# Patient Record
Sex: MALE | Employment: FULL TIME | URBAN - METROPOLITAN AREA
[De-identification: names, ages, dates, MRNs, and addresses within clinical notes are randomized per-mention and may not be internally consistent; named-entity substitution may affect disease eponyms.]

---

## 2018-09-06 ENCOUNTER — TRANSCRIBE ORDERS (OUTPATIENT)
Dept: ADMINISTRATIVE | Facility: HOSPITAL | Age: 23
End: 2018-09-06

## 2018-09-06 DIAGNOSIS — B17.10 ACUTE HEPATITIS C VIRUS INFECTION WITHOUT HEPATIC COMA: Primary | ICD-10-CM

## 2018-09-10 ENCOUNTER — HOSPITAL ENCOUNTER (OUTPATIENT)
Dept: ULTRASOUND IMAGING | Facility: HOSPITAL | Age: 23
Discharge: HOME | End: 2018-09-10
Attending: INTERNAL MEDICINE
Payer: COMMERCIAL

## 2018-09-10 DIAGNOSIS — B17.10 ACUTE HEPATITIS C VIRUS INFECTION WITHOUT HEPATIC COMA: ICD-10-CM

## 2018-09-10 PROCEDURE — 76700 US EXAM ABDOM COMPLETE: CPT

## 2019-11-18 ENCOUNTER — HOSPITAL ENCOUNTER (EMERGENCY)
Facility: HOSPITAL | Age: 24
Discharge: HOME | End: 2019-11-18
Attending: EMERGENCY MEDICINE
Payer: COMMERCIAL

## 2019-11-18 VITALS
RESPIRATION RATE: 18 BRPM | OXYGEN SATURATION: 96 % | HEART RATE: 69 BPM | DIASTOLIC BLOOD PRESSURE: 84 MMHG | WEIGHT: 180 LBS | SYSTOLIC BLOOD PRESSURE: 130 MMHG

## 2019-11-18 DIAGNOSIS — R79.89 ELEVATED LFTS: ICD-10-CM

## 2019-11-18 DIAGNOSIS — R11.2 NAUSEA AND VOMITING: Primary | ICD-10-CM

## 2019-11-18 DIAGNOSIS — R10.9 ABDOMINAL PAIN: ICD-10-CM

## 2019-11-18 DIAGNOSIS — F10.10 ALCOHOL ABUSE: ICD-10-CM

## 2019-11-18 LAB
ALBUMIN SERPL BCP-MCNC: 4.3 G/DL (ref 3.5–5)
ALP SERPL-CCNC: 98 U/L (ref 46–116)
ALT SERPL W P-5'-P-CCNC: 328 U/L (ref 12–78)
ANION GAP SERPL CALCULATED.3IONS-SCNC: 6 MMOL/L (ref 4–13)
AST SERPL W P-5'-P-CCNC: 415 U/L (ref 5–45)
BASOPHILS # BLD AUTO: 0.1 THOUSANDS/ΜL (ref 0–0.1)
BASOPHILS NFR BLD AUTO: 1 % (ref 0–1)
BILIRUB SERPL-MCNC: 1.04 MG/DL (ref 0.2–1)
BUN SERPL-MCNC: 6 MG/DL (ref 5–25)
CALCIUM SERPL-MCNC: 9.3 MG/DL (ref 8.3–10.1)
CHLORIDE SERPL-SCNC: 102 MMOL/L (ref 100–108)
CO2 SERPL-SCNC: 29 MMOL/L (ref 21–32)
CREAT SERPL-MCNC: 1.2 MG/DL (ref 0.6–1.3)
EOSINOPHIL # BLD AUTO: 0.04 THOUSAND/ΜL (ref 0–0.61)
EOSINOPHIL NFR BLD AUTO: 1 % (ref 0–6)
ERYTHROCYTE [DISTWIDTH] IN BLOOD BY AUTOMATED COUNT: 12.7 % (ref 11.6–15.1)
GFR SERPL CREATININE-BSD FRML MDRD: 84 ML/MIN/1.73SQ M
GLUCOSE SERPL-MCNC: 100 MG/DL (ref 65–140)
HCT VFR BLD AUTO: 49.2 % (ref 36.5–49.3)
HGB BLD-MCNC: 16.8 G/DL (ref 12–17)
IMM GRANULOCYTES # BLD AUTO: 0.02 THOUSAND/UL (ref 0–0.2)
IMM GRANULOCYTES NFR BLD AUTO: 0 % (ref 0–2)
INR PPP: 0.94 (ref 0.84–1.19)
LIPASE SERPL-CCNC: 87 U/L (ref 73–393)
LYMPHOCYTES # BLD AUTO: 1.23 THOUSANDS/ΜL (ref 0.6–4.47)
LYMPHOCYTES NFR BLD AUTO: 16 % (ref 14–44)
MCH RBC QN AUTO: 32.4 PG (ref 26.8–34.3)
MCHC RBC AUTO-ENTMCNC: 34.1 G/DL (ref 31.4–37.4)
MCV RBC AUTO: 95 FL (ref 82–98)
MONOCYTES # BLD AUTO: 0.88 THOUSAND/ΜL (ref 0.17–1.22)
MONOCYTES NFR BLD AUTO: 12 % (ref 4–12)
NEUTROPHILS # BLD AUTO: 5.21 THOUSANDS/ΜL (ref 1.85–7.62)
NEUTS SEG NFR BLD AUTO: 70 % (ref 43–75)
NRBC BLD AUTO-RTO: 0 /100 WBCS
PLATELET # BLD AUTO: 305 THOUSANDS/UL (ref 149–390)
PMV BLD AUTO: 9.9 FL (ref 8.9–12.7)
POTASSIUM SERPL-SCNC: 4.4 MMOL/L (ref 3.5–5.3)
PROT SERPL-MCNC: 8.2 G/DL (ref 6.4–8.2)
PROTHROMBIN TIME: 12.2 SECONDS (ref 11.6–14.5)
RBC # BLD AUTO: 5.19 MILLION/UL (ref 3.88–5.62)
SODIUM SERPL-SCNC: 137 MMOL/L (ref 136–145)
WBC # BLD AUTO: 7.48 THOUSAND/UL (ref 4.31–10.16)

## 2019-11-18 PROCEDURE — 85610 PROTHROMBIN TIME: CPT | Performed by: EMERGENCY MEDICINE

## 2019-11-18 PROCEDURE — C9113 INJ PANTOPRAZOLE SODIUM, VIA: HCPCS | Performed by: EMERGENCY MEDICINE

## 2019-11-18 PROCEDURE — 99285 EMERGENCY DEPT VISIT HI MDM: CPT | Performed by: EMERGENCY MEDICINE

## 2019-11-18 PROCEDURE — 80053 COMPREHEN METABOLIC PANEL: CPT | Performed by: EMERGENCY MEDICINE

## 2019-11-18 PROCEDURE — 99284 EMERGENCY DEPT VISIT MOD MDM: CPT

## 2019-11-18 PROCEDURE — 83690 ASSAY OF LIPASE: CPT | Performed by: EMERGENCY MEDICINE

## 2019-11-18 PROCEDURE — 96374 THER/PROPH/DIAG INJ IV PUSH: CPT

## 2019-11-18 PROCEDURE — 36415 COLL VENOUS BLD VENIPUNCTURE: CPT | Performed by: EMERGENCY MEDICINE

## 2019-11-18 PROCEDURE — 85025 COMPLETE CBC W/AUTO DIFF WBC: CPT | Performed by: EMERGENCY MEDICINE

## 2019-11-18 RX ORDER — MAGNESIUM HYDROXIDE/ALUMINUM HYDROXICE/SIMETHICONE 120; 1200; 1200 MG/30ML; MG/30ML; MG/30ML
30 SUSPENSION ORAL ONCE
Status: COMPLETED | OUTPATIENT
Start: 2019-11-18 | End: 2019-11-18

## 2019-11-18 RX ORDER — OMEPRAZOLE 20 MG/1
20 TABLET, DELAYED RELEASE ORAL DAILY
Qty: 30 TABLET | Refills: 1 | Status: SHIPPED | OUTPATIENT
Start: 2019-11-18 | End: 2020-07-13

## 2019-11-18 RX ORDER — PANTOPRAZOLE SODIUM 40 MG/1
40 INJECTION, POWDER, FOR SOLUTION INTRAVENOUS ONCE
Status: COMPLETED | OUTPATIENT
Start: 2019-11-18 | End: 2019-11-18

## 2019-11-18 RX ORDER — SUCRALFATE ORAL 1 G/10ML
1000 SUSPENSION ORAL ONCE
Status: COMPLETED | OUTPATIENT
Start: 2019-11-18 | End: 2019-11-18

## 2019-11-18 RX ADMIN — ALUMINUM HYDROXIDE, MAGNESIUM HYDROXIDE, AND SIMETHICONE 30 ML: 200; 200; 20 SUSPENSION ORAL at 08:24

## 2019-11-18 RX ADMIN — PANTOPRAZOLE SODIUM 40 MG: 40 INJECTION, POWDER, FOR SOLUTION INTRAVENOUS at 08:24

## 2019-11-18 RX ADMIN — SUCRALFATE 1000 MG: 1 SUSPENSION ORAL at 08:24

## 2019-11-18 NOTE — DISCHARGE INSTRUCTIONS
Call your GI specialist for follow-up today  Also follow up with the alcohol rehabilitation resources I have provided you  Take Prilosec daily as prescribed  Return to the ER with recurrent bloody vomit, worsening or changing abdominal pain, black or bloody stools, or any other concerning symptoms

## 2019-11-18 NOTE — ED ATTENDING ATTESTATION
11/18/2019  IZheng DO, saw and evaluated the patient  I have discussed the patient with the resident/non-physician practitioner and agree with the resident's/non-physician practitioner's findings, Plan of Care, and MDM as documented in the resident's/non-physician practitioner's note, except where noted  All available labs and Radiology studies were reviewed  I was present for key portions of any procedure(s) performed by the resident/non-physician practitioner and I was immediately available to provide assistance  At this point I agree with the current assessment done in the Emergency Department  I have conducted an independent evaluation of this patient a history and physical is as follows:      21 yom with hx of seizures, hep c, heavy ETOH abuse  1 bottle of rum  Vomiting in the morning for the past few days  NBNB until 3 days ago  Vomited dark"blood"  However past 24 hours no vomiting blood  Last drink 4 hours ago  [place]  Exam is soft, tender in epigastrum  No peritoniotis tachycardic  Nad  No tremors  A/P: hematemesis  resolved  Consider zora cervantes/gastritis  Check labs for liver failure, doubt varices, if hgb normal and lytes normal, tx for gastritis   Outpatient info for etoh cessation tx      ED Course         Critical Care Time  Procedures

## 2019-11-18 NOTE — ED PROVIDER NOTES
History  Chief Complaint   Patient presents with    Abdominal Pain     Pt presents with womiting and generalized abd pain since Friday  Blood noted in vomit  18-year-old male with past medical history of seizures not on antiepileptics, hepatitis C, and alcohol abuse who is presenting with vomiting  Patient reports that for the past 2 weeks, he has had vomiting nearly every day  He states that the vomiting occurs at night  Patient will wake from sleep and vomit  Vomit is usually stomach contents  Patient reports that on the night of November 16th, he did have dark blood in his vomit as well as coffee-ground material   However, the past several nights, the patient has not had any blood in his vomit  Patient reports associated abdominal pain which is sometimes in the left upper quadrant and sometimes in the right paraumbilical region  This pain is usually present when he wakes up but goes away without any intervention  He is not aware of any precipitating factors  Patient states that he did have a headache on Friday night but this resolved with Tylenol  Otherwise, patient denies any headache at this time, vision changes, focal numbness or weakness, neck pain or neck stiffness, chest pain or pressure, shortness of breath, nausea, active abdominal pain, diarrhea, constipation, melena, hematochezia, and urinary symptoms  Patient reports active alcohol abuse  He states he drinks 1 bottle of rum daily  Patient states that he gets shaky when he does not drink  He denies any clear history of alcohol withdrawal seizures  He did have seizure-like activity in April 2019  He was seen at [facility] for this  Ultimately, he left against medical advice  Patient states that at that time, he was drinking several beers per day but not drinking as heavily as he has right now  Patient denies any NSAID use    He is aware of his hepatitis C diagnosis but states that he was told by his GI specialist that it is not bad enough to be treated  Patient is not on any daily medications  No other concerns on history  None       Past Medical History:   Diagnosis Date    Hepatitis C     Seizures ([facility] [address] )        History reviewed  No pertinent surgical history  No family history on file  I have reviewed and agree with the history as documented  Social History     Tobacco Use    Smoking status: Current Every Day Smoker     Types: Cigarettes   Substance Use Topics    Alcohol use: Yes     Comment: 100 proof rum  A bottle a day   Drug use: Not Currently        Review of Systems   Constitutional: Negative for diaphoresis, fever and unexpected weight change  HENT: Negative for congestion, rhinorrhea and sore throat  Eyes: Negative for pain, discharge and visual disturbance  Respiratory: Negative for cough, shortness of breath and wheezing  Cardiovascular: Negative for chest pain, palpitations and leg swelling  Gastrointestinal: Positive for abdominal pain (intermittent, not at this time), nausea (not at this time) and vomiting (at night)  Negative for blood in stool, constipation and diarrhea  Genitourinary: Negative for dysuria, flank pain and hematuria  Musculoskeletal: Negative for arthralgias and myalgias  Skin: Negative for rash and wound  Allergic/Immunologic: Negative for environmental allergies and food allergies  Neurological: Negative for dizziness, seizures, weakness and numbness  Hematological: Negative for adenopathy  Psychiatric/Behavioral: Negative for confusion and hallucinations         Physical Exam  ED Triage Vitals [11/18/19 0739]   Temp Pulse Respirations Blood Pressure SpO2   -- (!) 118 16 (!) 172/102 97 %      Temp src Heart Rate Source Patient Position - Orthostatic VS BP Location FiO2 (%)   -- -- Lying Right arm --      Pain Score       6             Orthostatic Vital Signs  Vitals:    11/18/19 0739 11/18/19 0927   BP: (!) 172/102 130/84   Pulse: (!) 118 69   Patient Position - Orthostatic VS: Lying        Physical Exam   Constitutional: He is oriented to person, place, and time  He appears well-developed and well-nourished  HENT:   Head: Normocephalic and atraumatic  Right Ear: External ear normal    Left Ear: External ear normal    Nose: Nose normal    Eyes: Pupils are equal, round, and reactive to light  EOM are normal    Neck: Normal range of motion  Neck supple  Cardiovascular: Regular rhythm and normal heart sounds  Tachycardia present  No murmur heard  Pulmonary/Chest: Effort normal and breath sounds normal  No respiratory distress  He has no wheezes  He has no rales  Abdominal: Soft  Bowel sounds are normal  He exhibits no distension  There is no tenderness  There is no guarding  Musculoskeletal: Normal range of motion  He exhibits no deformity  Neurological: He is alert and oriented to person, place, and time  No gross motor deficits noted  Cranial nerves II-XII are intact  Speech is fluent without dysarthria or aphasia  Skin: Skin is warm and dry  Capillary refill takes less than 2 seconds  He is not diaphoretic  Psychiatric: He has a normal mood and affect  His behavior is normal    Nursing note and vitals reviewed        ED Medications  Medications   sucralfate (CARAFATE) oral suspension 1,000 mg (1,000 mg Oral Given 11/18/19 0824)   pantoprazole (PROTONIX) injection 40 mg (40 mg Intravenous Given 11/18/19 0824)   aluminum-magnesium hydroxide-simethicone (MYLANTA) 200-200-20 mg/5 mL oral suspension 30 mL (30 mL Oral Given 11/18/19 0824)       Diagnostic Studies  Results Reviewed     Procedure Component Value Units Date/Time    Comprehensive metabolic panel [[id number]]  (Abnormal) Collected:  11/18/19 0821    Lab Status:  Final result Specimen:  Blood from Arm, Left Updated:  11/18/19 0851     Sodium 137 mmol/L      Potassium 4 4 mmol/L      Chloride 102 mmol/L      CO2 29 mmol/L      ANION GAP 6 mmol/L      BUN 6 mg/dL      Creatinine 1 20 mg/dL Glucose 100 mg/dL      Calcium 9 3 mg/dL       U/L       U/L      Alkaline Phosphatase 98 U/L      Total Protein 8 2 g/dL      Albumin 4 3 g/dL      Total Bilirubin 1 04 mg/dL      eGFR 84 ml/min/1 73sq m     Narrative:       [place] guidelines for Chronic Kidney Disease (CKD):     Stage 1 with normal or high GFR (GFR > 90 mL/min/1 73 square meters)    Stage 2 Mild CKD (GFR = 60-89 mL/min/1 73 square meters)    Stage 3A Moderate CKD (GFR = 45-59 mL/min/1 73 square meters)    Stage 3B Moderate CKD (GFR = 30-44 mL/min/1 73 square meters)    Stage 4 Severe CKD (GFR = 15-29 mL/min/1 73 square meters)    Stage 5 End Stage CKD (GFR <15 mL/min/1 73 square meters)  Note: GFR calculation is accurate only with a steady state creatinine    Lipase [[id number]]  (Normal) Collected:  11/18/19 0821    Lab Status:  Final result Specimen:  Blood from Arm, Left Updated:  11/18/19 0851     Lipase 87 u/L     Protime-INR [[id number]]  (Normal) Collected:  11/18/19 0821    Lab Status:  Final result Specimen:  Blood from Arm, Left Updated:  11/18/19 0844     Protime 12 2 seconds      INR 0 94    CBC and differential [[id number]] Collected:  11/18/19 0821    Lab Status:  Final result Specimen:  Blood from Arm, Left Updated:  11/18/19 0836     WBC 7 48 Thousand/uL      RBC 5 19 Million/uL      Hemoglobin 16 8 g/dL      Hematocrit 49 2 %      MCV 95 fL      MCH 32 4 pg      MCHC 34 1 g/dL      RDW 12 7 %      MPV 9 9 fL      Platelets 727 Thousands/uL      nRBC 0 /100 WBCs      Neutrophils Relative 70 %      Immat GRANS % 0 %      Lymphocytes Relative 16 %      Monocytes Relative 12 %      Eosinophils Relative 1 %      Basophils Relative 1 %      Neutrophils Absolute 5 21 Thousands/µL      Immature Grans Absolute 0 02 Thousand/uL      Lymphocytes Absolute 1 23 Thousands/µL      Monocytes Absolute 0 88 Thousand/µL      Eosinophils Absolute 0 04 Thousand/µL      Basophils Absolute 0 10 Thousands/µL                  No orders to display         Procedures  Procedures        ED Course  ED Course as of Nov 18 1721   Mon Nov 18, 2019   0849 Hemoglobin: 16 8   0849 INR: 0 94   0915 Lipase: 87   0915 AST(!): 415   0915 ALT(!): 328   0915 Creatinine: 1 20   0916 Per review of records from [facility], patient has elevated AST and ALT at baseline  ALT most recently 265 and AST most recently 130 in April 2019  Creatinine is near recent baseline from April 2019 and July 2018  MDM  Number of Diagnoses or Management Options  Abdominal pain: new and requires workup  Alcohol abuse: established and worsening  Elevated LFTs: established and worsening  Nausea and vomiting: new and requires workup  Diagnosis management comments:     Patient presented with intermittent abdominal pain, intermittent nausea and vomiting, and worsening alcohol abuse as above  He did have several episodes of vomiting blood several nights prior to arrival but not recently  He was asymptomatic on arrival   Patient noted to have history of alcohol abuse as well as hepatitis C which was untreated  Vital signs were within normal limits  Physical examination was unremarkable  Differential diagnosis included but was not limited to esophagitis, Madiha-Bob tear, gastritis, esophageal varices, and peptic ulcer disease  Patient was treated with PPI and GI cocktail  Labs were ordered as above and were unremarkable apart from worsening transaminitis, likely secondary to both hepatitis C and worsening alcohol abuse  Etiology of symptoms unclear; will treat patient for potential GERD or gastritis with PPI  Provided patient with drug and alcohol resources  Advised prompt GI follow-up  Discussed return precautions  Patient verbalized understanding         Amount and/or Complexity of Data Reviewed  Clinical lab tests: ordered and reviewed  Decide to obtain previous medical records or to obtain history from someone other than the patient: yes  Obtain history from someone other than the patient: yes  Review and summarize past medical records: yes    Risk of Complications, Morbidity, and/or Mortality  Presenting problems: moderate  Diagnostic procedures: minimal  Management options: minimal    Patient Progress  Patient progress: improved      Disposition  Final diagnoses:   Nausea and vomiting   Abdominal pain   Elevated LFTs   Alcohol abuse     Time reflects when diagnosis was documented in both MDM as applicable and the Disposition within this note     Time User Action Codes Description Comment    11/18/2019  9:22 AM Ynes Bachelor Add [R11 2] Nausea and vomiting     11/18/2019  9:22 AM Ynes Bachelor Add [R10 9] Abdominal pain     11/18/2019  9:22 AM Ynes Bachelor Add [R94 5] Elevated LFTs     11/18/2019  5:20 PM Ynes Bachelor Add [F10 929] Alcohol intoxication ([facility] [address] )     11/18/2019  5:21 PM Ynes Bachelor Remove [F10 929] Alcohol intoxication ([facility] [address] )     11/18/2019  5:21 PM Ynes Bachelor Add [F10 10] Alcohol abuse       ED Disposition     ED Disposition Condition Date/Time Comment    Discharge Good Mon Nov 18, 2019  9:22 AM [address] discharge to home/self care  Follow-up Information     Follow up With Specialties Details Why Contact Info Additional Information    GI Physician  Call today Please call your GI specialist for follow-up  Please call today  [address] [place] [address] Emergency Department Emergency Medicine Go to  If symptoms worsen  [address] [place] [address], [address] [place] [address], [address]   [phone]          Discharge Medication List as of 11/18/2019  9:25 AM      START taking these medications    Details   omeprazole (PriLOSEC OTC) 20 MG tablet Take 1 tablet (20 mg total) by mouth daily, Starting Mon 11/18/2019, Until Wed 12/18/2019, Print           No discharge procedures on file      ED Provider  Attending physically available and evaluated [address]  I managed the patient along with the ED Attending      Electronically Signed by         Eladio Del Rio MD  11/18/19 3001

## 2020-07-13 ENCOUNTER — HOSPITAL ENCOUNTER (EMERGENCY)
Facility: HOSPITAL | Age: 25
Discharge: HOME | End: 2020-07-13
Attending: EMERGENCY MEDICINE
Payer: OTHER GOVERNMENT

## 2020-07-13 ENCOUNTER — APPOINTMENT (EMERGENCY)
Dept: CT IMAGING | Facility: HOSPITAL | Age: 25
End: 2020-07-13
Payer: OTHER GOVERNMENT

## 2020-07-13 VITALS
TEMPERATURE: 97.3 F | HEART RATE: 75 BPM | DIASTOLIC BLOOD PRESSURE: 86 MMHG | WEIGHT: 156.97 LBS | OXYGEN SATURATION: 99 % | SYSTOLIC BLOOD PRESSURE: 135 MMHG | RESPIRATION RATE: 17 BRPM

## 2020-07-13 DIAGNOSIS — Z72.89 ALCOHOL USE: ICD-10-CM

## 2020-07-13 DIAGNOSIS — R10.9 ABDOMINAL PAIN: Primary | ICD-10-CM

## 2020-07-13 DIAGNOSIS — E87.1 HYPONATREMIA: ICD-10-CM

## 2020-07-13 DIAGNOSIS — E87.6 HYPOKALEMIA: ICD-10-CM

## 2020-07-13 LAB
ALBUMIN SERPL BCP-MCNC: 4.1 G/DL (ref 3.5–5)
ALP SERPL-CCNC: 71 U/L (ref 46–116)
ALT SERPL W P-5'-P-CCNC: 215 U/L (ref 12–78)
ANION GAP SERPL CALCULATED.3IONS-SCNC: 8 MMOL/L (ref 4–13)
AST SERPL W P-5'-P-CCNC: 244 U/L (ref 5–45)
BASOPHILS # BLD AUTO: 0.06 THOUSANDS/ΜL (ref 0–0.1)
BASOPHILS NFR BLD AUTO: 1 % (ref 0–1)
BILIRUB SERPL-MCNC: 1.07 MG/DL (ref 0.2–1)
BILIRUB UR QL STRIP: NEGATIVE
BUN SERPL-MCNC: 15 MG/DL (ref 5–25)
CALCIUM SERPL-MCNC: 9.1 MG/DL (ref 8.3–10.1)
CHLORIDE SERPL-SCNC: 91 MMOL/L (ref 100–108)
CLARITY UR: CLEAR
CO2 SERPL-SCNC: 30 MMOL/L (ref 21–32)
COLOR UR: YELLOW
CREAT SERPL-MCNC: 1.03 MG/DL (ref 0.6–1.3)
EOSINOPHIL # BLD AUTO: 0.06 THOUSAND/ΜL (ref 0–0.61)
EOSINOPHIL NFR BLD AUTO: 1 % (ref 0–6)
ERYTHROCYTE [DISTWIDTH] IN BLOOD BY AUTOMATED COUNT: 11.5 % (ref 11.6–15.1)
GFR SERPL CREATININE-BSD FRML MDRD: 100 ML/MIN/1.73SQ M
GLUCOSE SERPL-MCNC: 106 MG/DL (ref 65–140)
GLUCOSE UR STRIP-MCNC: NEGATIVE MG/DL
HCT VFR BLD AUTO: 49.8 % (ref 36.5–49.3)
HGB BLD-MCNC: 17.7 G/DL (ref 12–17)
HGB UR QL STRIP.AUTO: NEGATIVE
IMM GRANULOCYTES # BLD AUTO: 0.02 THOUSAND/UL (ref 0–0.2)
IMM GRANULOCYTES NFR BLD AUTO: 0 % (ref 0–2)
KETONES UR STRIP-MCNC: ABNORMAL MG/DL
LEUKOCYTE ESTERASE UR QL STRIP: NEGATIVE
LYMPHOCYTES # BLD AUTO: 0.79 THOUSANDS/ΜL (ref 0.6–4.47)
LYMPHOCYTES NFR BLD AUTO: 10 % (ref 14–44)
MCH RBC QN AUTO: 33.3 PG (ref 26.8–34.3)
MCHC RBC AUTO-ENTMCNC: 35.5 G/DL (ref 31.4–37.4)
MCV RBC AUTO: 94 FL (ref 82–98)
MONOCYTES # BLD AUTO: 1.47 THOUSAND/ΜL (ref 0.17–1.22)
MONOCYTES NFR BLD AUTO: 19 % (ref 4–12)
NEUTROPHILS # BLD AUTO: 5.21 THOUSANDS/ΜL (ref 1.85–7.62)
NEUTS SEG NFR BLD AUTO: 69 % (ref 43–75)
NITRITE UR QL STRIP: NEGATIVE
NRBC BLD AUTO-RTO: 0 /100 WBCS
PH UR STRIP.AUTO: 6.5 [PH] (ref 4.5–8)
PLATELET # BLD AUTO: 221 THOUSANDS/UL (ref 149–390)
PMV BLD AUTO: 10.8 FL (ref 8.9–12.7)
POTASSIUM SERPL-SCNC: 3.2 MMOL/L (ref 3.5–5.3)
PROT SERPL-MCNC: 8 G/DL (ref 6.4–8.2)
PROT UR STRIP-MCNC: NEGATIVE MG/DL
RBC # BLD AUTO: 5.32 MILLION/UL (ref 3.88–5.62)
SARS-COV-2 RNA RESP QL NAA+PROBE: NEGATIVE
SODIUM SERPL-SCNC: 129 MMOL/L (ref 136–145)
SP GR UR STRIP.AUTO: 1.01 (ref 1–1.03)
UROBILINOGEN UR QL STRIP.AUTO: 1 E.U./DL
WBC # BLD AUTO: 7.61 THOUSAND/UL (ref 4.31–10.16)

## 2020-07-13 PROCEDURE — 85025 COMPLETE CBC W/AUTO DIFF WBC: CPT | Performed by: EMERGENCY MEDICINE

## 2020-07-13 PROCEDURE — 36415 COLL VENOUS BLD VENIPUNCTURE: CPT | Performed by: EMERGENCY MEDICINE

## 2020-07-13 PROCEDURE — 96361 HYDRATE IV INFUSION ADD-ON: CPT

## 2020-07-13 PROCEDURE — 96374 THER/PROPH/DIAG INJ IV PUSH: CPT

## 2020-07-13 PROCEDURE — 81003 URINALYSIS AUTO W/O SCOPE: CPT

## 2020-07-13 PROCEDURE — 74177 CT ABD & PELVIS W/CONTRAST: CPT

## 2020-07-13 PROCEDURE — 87635 SARS-COV-2 COVID-19 AMP PRB: CPT | Performed by: EMERGENCY MEDICINE

## 2020-07-13 PROCEDURE — 99285 EMERGENCY DEPT VISIT HI MDM: CPT

## 2020-07-13 PROCEDURE — 99284 EMERGENCY DEPT VISIT MOD MDM: CPT | Performed by: EMERGENCY MEDICINE

## 2020-07-13 PROCEDURE — 80053 COMPREHEN METABOLIC PANEL: CPT | Performed by: EMERGENCY MEDICINE

## 2020-07-13 RX ORDER — KETOROLAC TROMETHAMINE 30 MG/ML
15 INJECTION, SOLUTION INTRAMUSCULAR; INTRAVENOUS ONCE
Status: COMPLETED | OUTPATIENT
Start: 2020-07-13 | End: 2020-07-13

## 2020-07-13 RX ADMIN — KETOROLAC TROMETHAMINE 15 MG: 30 INJECTION, SOLUTION INTRAMUSCULAR at 10:50

## 2020-07-13 RX ADMIN — SODIUM CHLORIDE 1000 ML: 0.9 INJECTION, SOLUTION INTRAVENOUS at 10:49

## 2020-07-13 RX ADMIN — IOHEXOL 100 ML: 350 INJECTION, SOLUTION INTRAVENOUS at 11:55

## 2020-07-13 NOTE — ED PROVIDER NOTES
History  Chief Complaint   Patient presents with    Abdominal Pain     Chest pain, abdominal pain, lower back pain, vomiting, diarrhea x 3 days  Denies sob  Reports cough   Chest Pain    Back Pain     58-year-old male presents for evaluation of 3 days of constant abdominal pain radiating to the lower back with associated nausea vomiting and diarrhea  The patient describes nonbilious nonbloody diarrhea vomitus  The patient denies any sick contacts, questionable food ingestions or travel  Patient denies surgical history  The patient has not taken anything to make his symptoms better  He states the pain is now migrated more to the lower part of his abdomen slightly to the right  He also notes that he has had darker urine  Patient denies fevers or chills          None       Past Medical History:   Diagnosis Date    Hepatitis C     Seizures ([facility] [address] )        History reviewed  No pertinent surgical history  History reviewed  No pertinent family history  I have reviewed and agree with the history as documented  E-Cigarette/Vaping    E-Cigarette Use Never User      E-Cigarette/Vaping Substances     Social History     Tobacco Use    Smoking status: Current Every Day Smoker     Types: Cigarettes    Smokeless tobacco: Never Used   Substance Use Topics    Alcohol use: Yes     Comment: 100 proof rum  A bottle a day   Drug use: Not Currently       Review of Systems   Constitutional: Negative for chills, fatigue and fever  HENT: Negative for sore throat  Respiratory: Negative for cough, chest tightness and shortness of breath  Cardiovascular: Negative for chest pain and palpitations  Gastrointestinal: Positive for abdominal pain, diarrhea, nausea and vomiting  Negative for blood in stool and constipation  Genitourinary: Negative for difficulty urinating and dysuria  Musculoskeletal: Negative for back pain  Skin: Negative for rash     Neurological: Negative for dizziness, seizures, syncope, weakness and headaches  All other systems reviewed and are negative  Physical Exam  Physical Exam   Constitutional: He is oriented to person, place, and time  He appears well-developed and well-nourished  No distress  HENT:   Head: Normocephalic and atraumatic  Right Ear: External ear normal    Left Ear: External ear normal    Mouth/Throat: No oropharyngeal exudate  Eyes: Pupils are equal, round, and reactive to light  EOM are normal  No scleral icterus  Neck: Normal range of motion  Neck supple  Cardiovascular: Normal rate, regular rhythm and normal heart sounds  Pulmonary/Chest: Effort normal and breath sounds normal  No respiratory distress  Abdominal: Soft  Bowel sounds are normal  There is tenderness in the right lower quadrant  There is tenderness at McBurney's point  There is no rebound, no guarding and no CVA tenderness  Musculoskeletal: Normal range of motion  Neurological: He is alert and oriented to person, place, and time  Skin: Skin is warm and dry  No rash noted  Psychiatric: He has a normal mood and affect  Nursing note and vitals reviewed        Vital Signs  ED Triage Vitals   Temperature Pulse Respirations Blood Pressure SpO2   07/13/20 1019 07/13/20 1019 07/13/20 1019 07/13/20 1019 07/13/20 1019   (!) 97 3 °F (36 3 °C) 104 18 136/91 99 %      Temp Source Heart Rate Source Patient Position - Orthostatic VS BP Location FiO2 (%)   07/13/20 1019 07/13/20 1230 07/13/20 1230 07/13/20 1230 --   Temporal Monitor Sitting Right arm       Pain Score       07/13/20 1019       8           Vitals:    07/13/20 1019 07/13/20 1230   BP: 136/91 135/86   Pulse: 104 75   Patient Position - Orthostatic VS:  Sitting         Visual Acuity      ED Medications  Medications   sodium chloride 0 9 % bolus 1,000 mL (0 mL Intravenous Stopped 7/13/20 1231)   ketorolac (TORADOL) injection 15 mg (15 mg Intravenous Given 7/13/20 1050)   iohexol (OMNIPAQUE) 350 MG/ML injection (MULTI-DOSE) 100 mL (100 mL Intravenous Given 7/13/20 1155)       Diagnostic Studies  Results Reviewed     Procedure Component Value Units Date/Time    Novel Coronavirus [facility] [[id number]]  (Normal) Collected:  07/13/20 1049    Lab Status:  Final result Specimen:  Nares from Nose Updated:  07/13/20 1310     SARS-CoV-2 Negative    Narrative: The specimen collection materials, transport medium, and/or testing methodology utilized in the production of these test results have been proven to be reliable in a limited validation with an abbreviated program under the Emergency Utilization Authorization provided by the FDA  Testing reported as "Presumptive positive" will be confirmed with secondary testing with a reference laboratory to ensure result accuracy  Clinical caution and judgement should be used with the interpretation of these results with consideration of the clinical impression and other laboratory testing  Testing reported as "Positive" or "Negative" has been proven to be accurate according to standard laboratory validation requirements  All testing is performed with control materials showing appropriate reactivity at standard intervals        POCT urinalysis dipstick [[id number]]  (Abnormal) Resulted:  07/13/20 1229    Lab Status:  Final result Specimen:  Urine Updated:  07/13/20 1229    Urine Macroscopic, POC [[id number]]  (Abnormal) Collected:  07/13/20 1226    Lab Status:  Final result Specimen:  Urine Updated:  07/13/20 1228     Color, UA Yellow     Clarity, UA Clear     pH, UA 6 5     Leukocytes, UA Negative     Nitrite, UA Negative     Protein, UA Negative mg/dl      Glucose, UA Negative mg/dl      Ketones, UA Trace mg/dl      Urobilinogen, UA 1 0 E U /dl      Bilirubin, UA Negative     Blood, UA Negative     Specific Gravity, UA 1 010    Narrative:       CLINITEK RESULT    Comprehensive metabolic panel [[id number]]  (Abnormal) Collected:  07/13/20 1049    Lab Status:  Final result Specimen:  Blood from Arm, Right Updated:  07/13/20 1124     Sodium 129 mmol/L      Potassium 3 2 mmol/L      Chloride 91 mmol/L      CO2 30 mmol/L      ANION GAP 8 mmol/L      BUN 15 mg/dL      Creatinine 1 03 mg/dL      Glucose 106 mg/dL      Calcium 9 1 mg/dL       U/L       U/L      Alkaline Phosphatase 71 U/L      Total Protein 8 0 g/dL      Albumin 4 1 g/dL      Total Bilirubin 1 07 mg/dL      eGFR 100 ml/min/1 73sq m     Narrative:       National Kidney Disease Foundation guidelines for Chronic Kidney Disease (CKD):     Stage 1 with normal or high GFR (GFR > 90 mL/min/1 73 square meters)    Stage 2 Mild CKD (GFR = 60-89 mL/min/1 73 square meters)    Stage 3A Moderate CKD (GFR = 45-59 mL/min/1 73 square meters)    Stage 3B Moderate CKD (GFR = 30-44 mL/min/1 73 square meters)    Stage 4 Severe CKD (GFR = 15-29 mL/min/1 73 square meters)    Stage 5 End Stage CKD (GFR <15 mL/min/1 73 square meters)  Note: GFR calculation is accurate only with a steady state creatinine    CBC and differential [[id number]]  (Abnormal) Collected:  07/13/20 1049    Lab Status:  Final result Specimen:  Blood from Arm, Right Updated:  07/13/20 1114     WBC 7 61 Thousand/uL      RBC 5 32 Million/uL      Hemoglobin 17 7 g/dL      Hematocrit 49 8 %      MCV 94 fL      MCH 33 3 pg      MCHC 35 5 g/dL      RDW 11 5 %      MPV 10 8 fL      Platelets 271 Thousands/uL      nRBC 0 /100 WBCs      Neutrophils Relative 69 %      Immat GRANS % 0 %      Lymphocytes Relative 10 %      Monocytes Relative 19 %      Eosinophils Relative 1 %      Basophils Relative 1 %      Neutrophils Absolute 5 21 Thousands/µL      Immature Grans Absolute 0 02 Thousand/uL      Lymphocytes Absolute 0 79 Thousands/µL      Monocytes Absolute 1 47 Thousand/µL      Eosinophils Absolute 0 06 Thousand/µL      Basophils Absolute 0 06 Thousands/µL                  CT abdomen pelvis with contrast   Final Result by Maria Berrios MD (07/13 1216)      1    No acute inflammatory process in the abdomen or pelvis  2   Diffuse hepatic steatosis  3   3 mm right middle lobe lung nodule  Based on current Fleischner Society 2017 Guidelines on incidental pulmonary nodule, in this patient who is less than 39years of age, management decisions should be made on a case by case basis, keeping in mind    that infectious causes are more likely than cancer and that use of serial CT should be minimized  Workstation performed: [id number]                    Procedures  Procedures         ED Course  ED Course as of Jul 13 1330   Mon Jul 13, 2020   1226 Patient stable upon re-eval  Discussed lab abnormalities and regular ETOH use as etiology of transaminities, hyponatremia and hypokalemia  Patient counseled on ETOH intake and avoidance as well as bland diet and dietary potassium supplementation  MDM  Number of Diagnoses or Management Options  Abdominal pain:   Alcohol use:   Hypokalemia:   Hyponatremia:   Diagnosis management comments: Differential diagnosis:  Appendicitis, ureteral calculus, urinary tract infection, pyelonephritis, diverticulitis, enteritis, colitis, ileus bowel obstruction, other  Plan is for urinalysis, laboratories and CT abdomen pelvis  The patient will receive Toradol for pain control  The patient (and any family present) verbalized understanding of the discharge instructions and warnings that would necessitate return to the Emergency Department  All questions were answered prior to discharge         Amount and/or Complexity of Data Reviewed  Clinical lab tests: ordered and reviewed  Tests in the radiology section of CPT®: ordered and reviewed  Decide to obtain previous medical records or to obtain history from someone other than the patient: yes  Review and summarize past medical records: yes  Independent visualization of images, tracings, or specimens: yes          Disposition  Final diagnoses:   Abdominal pain Hyponatremia   Hypokalemia   Alcohol use     Time reflects when diagnosis was documented in both MDM as applicable and the Disposition within this note     Time User Action Codes Description Comment    7/13/2020 12:28 PM Hickman Likens Add [R10 9] Abdominal pain     7/13/2020 12:28 PM Hickman Likens Add [E87 1] Hyponatremia     7/13/2020 12:28 PM Hickman Likens Add [E87 6] Hypokalemia     7/13/2020 12:28 PM Hickman Likens Add [Z72 89] Alcohol use       ED Disposition     ED Disposition Condition Date/Time Comment    Discharge Stable Mon Jul 13, 2020 12:27 PM [address] discharge to home/self care  Follow-up Information     Follow up With Specialties Details Why Contact Abram Johnson DO Family Medicine Schedule an appointment as soon as possible for a visit  For further evaluation Rachna PARKER  [address]  [phone]            There are no discharge medications for this patient  No discharge procedures on file      PDMP Review     None          ED Provider  Electronically Signed by           Terry Rivera DO  07/13/20 6952